# Patient Record
Sex: FEMALE | Race: WHITE | NOT HISPANIC OR LATINO
[De-identification: names, ages, dates, MRNs, and addresses within clinical notes are randomized per-mention and may not be internally consistent; named-entity substitution may affect disease eponyms.]

---

## 2017-04-19 ENCOUNTER — MEDICATION RENEWAL (OUTPATIENT)
Age: 22
End: 2017-04-19

## 2017-05-24 ENCOUNTER — MEDICATION RENEWAL (OUTPATIENT)
Age: 22
End: 2017-05-24

## 2018-04-09 ENCOUNTER — APPOINTMENT (OUTPATIENT)
Dept: OBGYN | Facility: CLINIC | Age: 23
End: 2018-04-09
Payer: COMMERCIAL

## 2018-04-09 VITALS
HEIGHT: 64 IN | BODY MASS INDEX: 23.22 KG/M2 | DIASTOLIC BLOOD PRESSURE: 73 MMHG | SYSTOLIC BLOOD PRESSURE: 117 MMHG | WEIGHT: 136 LBS | HEART RATE: 93 BPM

## 2018-04-09 PROCEDURE — 99213 OFFICE O/P EST LOW 20 MIN: CPT

## 2018-04-20 ENCOUNTER — APPOINTMENT (OUTPATIENT)
Dept: OBGYN | Facility: CLINIC | Age: 23
End: 2018-04-20
Payer: COMMERCIAL

## 2018-04-20 VITALS
DIASTOLIC BLOOD PRESSURE: 70 MMHG | WEIGHT: 136 LBS | SYSTOLIC BLOOD PRESSURE: 118 MMHG | HEIGHT: 64 IN | BODY MASS INDEX: 23.22 KG/M2

## 2018-04-20 DIAGNOSIS — B96.89 ACUTE VAGINITIS: ICD-10-CM

## 2018-04-20 DIAGNOSIS — N76.0 ACUTE VAGINITIS: ICD-10-CM

## 2018-04-20 PROCEDURE — 99213 OFFICE O/P EST LOW 20 MIN: CPT | Mod: 25

## 2018-04-20 PROCEDURE — 99395 PREV VISIT EST AGE 18-39: CPT

## 2018-04-20 RX ORDER — METRONIDAZOLE 7.5 MG/G
0.75 GEL VAGINAL
Qty: 1 | Refills: 2 | Status: ACTIVE | COMMUNITY
Start: 2018-04-20 | End: 1900-01-01

## 2018-04-23 LAB
C TRACH RRNA SPEC QL NAA+PROBE: NOT DETECTED
N GONORRHOEA RRNA SPEC QL NAA+PROBE: NOT DETECTED
SOURCE AMPLIFICATION: NORMAL
SOURCE TP AMPLIFICATION: NORMAL
T VAGINALIS RRNA SPEC QL NAA+PROBE: NOT DETECTED

## 2018-04-27 LAB — CYTOLOGY CVX/VAG DOC THIN PREP: NORMAL

## 2018-06-12 ENCOUNTER — APPOINTMENT (OUTPATIENT)
Dept: OBGYN | Facility: CLINIC | Age: 23
End: 2018-06-12
Payer: COMMERCIAL

## 2018-06-12 VITALS
HEIGHT: 64 IN | BODY MASS INDEX: 23.22 KG/M2 | SYSTOLIC BLOOD PRESSURE: 110 MMHG | DIASTOLIC BLOOD PRESSURE: 70 MMHG | WEIGHT: 136 LBS

## 2018-06-12 DIAGNOSIS — N80.1 ENDOMETRIOSIS OF OVARY: ICD-10-CM

## 2018-06-12 PROCEDURE — 99214 OFFICE O/P EST MOD 30 MIN: CPT

## 2018-06-12 PROCEDURE — 76830 TRANSVAGINAL US NON-OB: CPT

## 2018-06-12 PROCEDURE — 76857 US EXAM PELVIC LIMITED: CPT

## 2018-09-13 ENCOUNTER — APPOINTMENT (OUTPATIENT)
Dept: OBGYN | Facility: CLINIC | Age: 23
End: 2018-09-13
Payer: COMMERCIAL

## 2018-09-13 ENCOUNTER — ASOB RESULT (OUTPATIENT)
Age: 23
End: 2018-09-13

## 2018-09-13 VITALS
HEIGHT: 64 IN | SYSTOLIC BLOOD PRESSURE: 110 MMHG | WEIGHT: 142 LBS | DIASTOLIC BLOOD PRESSURE: 68 MMHG | BODY MASS INDEX: 24.24 KG/M2

## 2018-09-13 DIAGNOSIS — Z86.19 PERSONAL HISTORY OF OTHER INFECTIOUS AND PARASITIC DISEASES: ICD-10-CM

## 2018-09-13 PROCEDURE — 76830 TRANSVAGINAL US NON-OB: CPT

## 2018-09-13 PROCEDURE — 76857 US EXAM PELVIC LIMITED: CPT

## 2018-09-13 PROCEDURE — 99214 OFFICE O/P EST MOD 30 MIN: CPT

## 2018-09-17 LAB — BACTERIA GENITAL AEROBE CULT: ABNORMAL

## 2019-05-20 ENCOUNTER — MEDICATION RENEWAL (OUTPATIENT)
Age: 24
End: 2019-05-20

## 2019-05-20 RX ORDER — LEVONORGESTREL AND ETHINYL ESTRADIOL 0.1-0.02MG
0.1-2 KIT ORAL
Qty: 90 | Refills: 3 | Status: ACTIVE | COMMUNITY
Start: 2018-09-13 | End: 1900-01-01

## 2019-05-24 ENCOUNTER — APPOINTMENT (OUTPATIENT)
Dept: OBGYN | Facility: CLINIC | Age: 24
End: 2019-05-24
Payer: COMMERCIAL

## 2019-05-24 VITALS
WEIGHT: 147 LBS | DIASTOLIC BLOOD PRESSURE: 81 MMHG | HEIGHT: 64 IN | SYSTOLIC BLOOD PRESSURE: 138 MMHG | BODY MASS INDEX: 25.1 KG/M2

## 2019-05-24 PROCEDURE — 99395 PREV VISIT EST AGE 18-39: CPT

## 2019-05-24 NOTE — COUNSELING
[Nutrition] : nutrition [Breast Self Exam] : breast self exam [Contraception] : contraception [Vitamins/Supplements] : vitamins/supplements [Exercise] : exercise

## 2019-05-24 NOTE — PHYSICAL EXAM
[Alert] : alert [Awake] : awake [Soft] : soft [Oriented x3] : oriented to person, place, and time [Normal] : cervix [No Bleeding] : there was no active vaginal bleeding [Uterine Adnexae] : were not tender and not enlarged [Acute Distress] : no acute distress [LAD] : no lymphadenopathy [Thyroid Nodule] : no thyroid nodule [Goiter] : no goiter [Mass] : no breast mass [Nipple Discharge] : no nipple discharge [Axillary LAD] : no axillary lymphadenopathy [Tender] : non tender [Distended] : not distended [H/Smegaly] : no hepatosplenomegaly [Depressed Mood] : not depressed [Flat Affect] : affect not flat

## 2019-05-31 ENCOUNTER — TRANSCRIPTION ENCOUNTER (OUTPATIENT)
Age: 24
End: 2019-05-31

## 2019-05-31 LAB
C TRACH RRNA SPEC QL NAA+PROBE: NORMAL
CYTOLOGY CVX/VAG DOC THIN PREP: NORMAL
N GONORRHOEA RRNA SPEC QL NAA+PROBE: NOT DETECTED
SOURCE TP AMPLIFICATION: NORMAL

## 2019-06-06 ENCOUNTER — APPOINTMENT (OUTPATIENT)
Dept: OBGYN | Facility: CLINIC | Age: 24
End: 2019-06-06

## 2020-02-12 ENCOUNTER — APPOINTMENT (OUTPATIENT)
Dept: OBGYN | Facility: CLINIC | Age: 25
End: 2020-02-12
Payer: COMMERCIAL

## 2020-02-12 VITALS
SYSTOLIC BLOOD PRESSURE: 125 MMHG | DIASTOLIC BLOOD PRESSURE: 80 MMHG | WEIGHT: 147 LBS | BODY MASS INDEX: 25.1 KG/M2 | HEIGHT: 64 IN

## 2020-02-12 PROCEDURE — 99214 OFFICE O/P EST MOD 30 MIN: CPT

## 2020-02-12 NOTE — PHYSICAL EXAM
[Normal] : uterus [No Bleeding] : there was no active vaginal bleeding [Uterine Adnexae] : was normal on the left [FreeTextEntry7] : right adnexal fullness,slightly tender to palp

## 2020-02-13 LAB
C TRACH RRNA SPEC QL NAA+PROBE: NOT DETECTED
N GONORRHOEA RRNA SPEC QL NAA+PROBE: NOT DETECTED
SOURCE AMPLIFICATION: NORMAL

## 2020-02-24 ENCOUNTER — APPOINTMENT (OUTPATIENT)
Dept: OBGYN | Facility: CLINIC | Age: 25
End: 2020-02-24
Payer: COMMERCIAL

## 2020-02-24 ENCOUNTER — ASOB RESULT (OUTPATIENT)
Age: 25
End: 2020-02-24

## 2020-02-24 VITALS
SYSTOLIC BLOOD PRESSURE: 115 MMHG | DIASTOLIC BLOOD PRESSURE: 79 MMHG | WEIGHT: 142 LBS | HEIGHT: 64 IN | BODY MASS INDEX: 24.24 KG/M2

## 2020-02-24 PROCEDURE — 99214 OFFICE O/P EST MOD 30 MIN: CPT

## 2020-02-24 PROCEDURE — 76830 TRANSVAGINAL US NON-OB: CPT

## 2020-02-24 PROCEDURE — 76856 US EXAM PELVIC COMPLETE: CPT | Mod: 59

## 2020-04-07 ENCOUNTER — RX RENEWAL (OUTPATIENT)
Age: 25
End: 2020-04-07

## 2020-04-07 RX ORDER — LEVONORGESTREL AND ETHINYL ESTRADIOL 0.1-0.02MG
0.1-2 KIT ORAL DAILY
Qty: 84 | Refills: 0 | Status: ACTIVE | COMMUNITY
Start: 2020-04-07 | End: 1900-01-01

## 2020-12-16 PROBLEM — N76.0 BACTERIAL VAGINOSIS: Status: RESOLVED | Noted: 2018-04-20 | Resolved: 2020-12-16

## 2020-12-16 PROBLEM — Z86.19 HISTORY OF CANDIDIASIS OF VAGINA: Status: RESOLVED | Noted: 2018-09-17 | Resolved: 2020-12-16

## 2021-06-17 ENCOUNTER — RESULT REVIEW (OUTPATIENT)
Age: 26
End: 2021-06-17

## 2021-07-29 ENCOUNTER — APPOINTMENT (OUTPATIENT)
Dept: OBGYN | Facility: CLINIC | Age: 26
End: 2021-07-29
Payer: COMMERCIAL

## 2021-07-29 VITALS
BODY MASS INDEX: 24.75 KG/M2 | WEIGHT: 145 LBS | DIASTOLIC BLOOD PRESSURE: 78 MMHG | HEIGHT: 64 IN | SYSTOLIC BLOOD PRESSURE: 108 MMHG

## 2021-07-29 DIAGNOSIS — Z30.9 ENCOUNTER FOR CONTRACEPTIVE MANAGEMENT, UNSPECIFIED: ICD-10-CM

## 2021-07-29 DIAGNOSIS — Z30.41 ENCOUNTER FOR SURVEILLANCE OF CONTRACEPTIVE PILLS: ICD-10-CM

## 2021-07-29 PROCEDURE — 99395 PREV VISIT EST AGE 18-39: CPT

## 2021-07-29 PROCEDURE — 99213 OFFICE O/P EST LOW 20 MIN: CPT | Mod: 25

## 2021-07-29 NOTE — HISTORY OF PRESENT ILLNESS
[FreeTextEntry1] : 25 yo hx of endometroma, pt admits to having  a heavy menses this month after having surgery for tonsils. SHe did also have lower pelvic pain.  uses condoms and is happy did not do well with OC.  couldn’t sleep and guzman. [Currently Active] : currently active [Men] : men [Vaginal] : vaginal [No] : No

## 2021-07-29 NOTE — DISCUSSION/SUMMARY
[FreeTextEntry1] : encouraged pt to exercise 2.5 hours minimally a week as per the [facility] , to monitor her cycles, to take calcium foods equalling 1000 mg daily and vitamin D 2000 IU daily and fish oil or flax seed oil for omega 3s. REturn in one yr for a visit. questions answered.\par Send for vag sono

## 2021-07-29 NOTE — PHYSICAL EXAM
[Appropriately responsive] : appropriately responsive [Alert] : alert [No Acute Distress] : no acute distress [No Lymphadenopathy] : no lymphadenopathy [Soft] : soft [Non-tender] : non-tender [Non-distended] : non-distended [No HSM] : No HSM [No Lesions] : no lesions [No Mass] : no mass [Oriented x3] : oriented x3 [Examination Of The Breasts] : a normal appearance [No Masses] : no breast masses were palpable [Labia Majora] : normal [Labia Minora] : normal [Normal] : normal [Uterine Adnexae] : normal [FreeTextEntry6] : some discomfort in the llq

## 2021-08-05 LAB — CYTOLOGY CVX/VAG DOC THIN PREP: ABNORMAL

## 2021-08-05 RX ORDER — FLUCONAZOLE 150 MG/1
150 TABLET ORAL
Qty: 2 | Refills: 0 | Status: ACTIVE | COMMUNITY
Start: 2018-09-17 | End: 1900-01-01

## 2021-08-17 ENCOUNTER — ASOB RESULT (OUTPATIENT)
Age: 26
End: 2021-08-17

## 2021-08-17 ENCOUNTER — APPOINTMENT (OUTPATIENT)
Dept: OBGYN | Facility: CLINIC | Age: 26
End: 2021-08-17
Payer: COMMERCIAL

## 2021-08-17 VITALS
BODY MASS INDEX: 24.92 KG/M2 | HEART RATE: 82 BPM | WEIGHT: 146 LBS | DIASTOLIC BLOOD PRESSURE: 73 MMHG | HEIGHT: 64 IN | SYSTOLIC BLOOD PRESSURE: 111 MMHG

## 2021-08-17 PROCEDURE — 99214 OFFICE O/P EST MOD 30 MIN: CPT

## 2021-08-17 PROCEDURE — 76856 US EXAM PELVIC COMPLETE: CPT | Mod: 59

## 2021-08-17 PROCEDURE — 76830 TRANSVAGINAL US NON-OB: CPT

## 2021-08-17 NOTE — HISTORY OF PRESENT ILLNESS
[FreeTextEntry1] : 27 yo had vaginal sono revieled a corpus leuteum cyst that was hemmorragic. She has no pain today. WE disc following up in 5 weeks.

## 2021-08-17 NOTE — DISCUSSION/SUMMARY
[FreeTextEntry1] :  Pt has no pain today, hemmoragic corpus leuteum cystfollow up after next cycle for resolution of cyst. \par motrin for pain and rtn if increased pain

## 2021-09-14 ENCOUNTER — APPOINTMENT (OUTPATIENT)
Dept: OBGYN | Facility: CLINIC | Age: 26
End: 2021-09-14
Payer: COMMERCIAL

## 2021-09-14 ENCOUNTER — ASOB RESULT (OUTPATIENT)
Age: 26
End: 2021-09-14

## 2021-09-14 VITALS — WEIGHT: 146 LBS | HEIGHT: 64 IN | BODY MASS INDEX: 24.92 KG/M2

## 2021-09-14 DIAGNOSIS — N83.12 CORPUS LUTEUM CYST OF LEFT OVARY: ICD-10-CM

## 2021-09-14 DIAGNOSIS — N83.201 UNSPECIFIED OVARIAN CYST, RIGHT SIDE: ICD-10-CM

## 2021-09-14 PROCEDURE — 99213 OFFICE O/P EST LOW 20 MIN: CPT

## 2021-09-14 PROCEDURE — 76830 TRANSVAGINAL US NON-OB: CPT

## 2021-09-14 PROCEDURE — 76857 US EXAM PELVIC LIMITED: CPT

## 2021-09-14 NOTE — DISCUSSION/SUMMARY
[FreeTextEntry1] : disc resolution of the hemorragic  corpus luteum, now has 3.5cm simple ovarian right cyst. can follow up in 6 weeks

## 2021-09-14 NOTE — HISTORY OF PRESENT ILLNESS
[FreeTextEntry1] : 27 yo here to review rpt sono for fu on a hemorrhagic corpus luteum cyst.  she denies any pain  or prob at this time

## 2022-05-08 ENCOUNTER — EMERGENCY (EMERGENCY)
Facility: HOSPITAL | Age: 27
LOS: 1 days | Discharge: HOME | End: 2022-05-08
Admitting: EMERGENCY MEDICINE
Payer: MEDICAID

## 2022-05-08 PROCEDURE — 99284 EMERGENCY DEPT VISIT MOD MDM: CPT

## 2022-05-10 DIAGNOSIS — R05.9 COUGH, UNSPECIFIED: ICD-10-CM

## 2022-05-10 DIAGNOSIS — J45.909 UNSPECIFIED ASTHMA, UNCOMPLICATED: ICD-10-CM

## 2022-05-10 DIAGNOSIS — U07.1 COVID-19: ICD-10-CM

## 2022-09-06 ENCOUNTER — APPOINTMENT (OUTPATIENT)
Dept: OBGYN | Facility: CLINIC | Age: 27
End: 2022-09-06

## 2022-09-06 ENCOUNTER — NON-APPOINTMENT (OUTPATIENT)
Age: 27
End: 2022-09-06

## 2022-09-06 VITALS
DIASTOLIC BLOOD PRESSURE: 70 MMHG | BODY MASS INDEX: 24.92 KG/M2 | SYSTOLIC BLOOD PRESSURE: 110 MMHG | WEIGHT: 146 LBS | HEIGHT: 64 IN

## 2022-09-06 DIAGNOSIS — Z01.419 ENCOUNTER FOR GYNECOLOGICAL EXAMINATION (GENERAL) (ROUTINE) W/OUT ABNORMAL FINDINGS: ICD-10-CM

## 2022-09-06 PROCEDURE — 99395 PREV VISIT EST AGE 18-39: CPT

## 2022-09-06 NOTE — PLAN
[FreeTextEntry1] : Well woman visit\par \par pap done\par std cultures done\par rt in 1 year\par \par Left ovarian cyst- rt for pelvic sono to monitor cyst and then can see Md to discuss endometriosis, i explained how Ocp help reduce formation of ovarian cyst and also are first line medication for endometriosis.

## 2022-09-07 LAB
C TRACH RRNA SPEC QL NAA+PROBE: NOT DETECTED
N GONORRHOEA RRNA SPEC QL NAA+PROBE: NOT DETECTED
SOURCE TP AMPLIFICATION: NORMAL

## 2022-09-12 LAB — CYTOLOGY CVX/VAG DOC THIN PREP: NORMAL

## 2022-10-05 ENCOUNTER — APPOINTMENT (OUTPATIENT)
Dept: ANTEPARTUM | Facility: CLINIC | Age: 27
End: 2022-10-05

## 2022-10-05 ENCOUNTER — ASOB RESULT (OUTPATIENT)
Age: 27
End: 2022-10-05

## 2022-10-05 ENCOUNTER — APPOINTMENT (OUTPATIENT)
Dept: OBGYN | Facility: CLINIC | Age: 27
End: 2022-10-05

## 2022-10-05 VITALS
BODY MASS INDEX: 25.1 KG/M2 | HEIGHT: 64 IN | SYSTOLIC BLOOD PRESSURE: 110 MMHG | WEIGHT: 147 LBS | DIASTOLIC BLOOD PRESSURE: 73 MMHG

## 2022-10-05 DIAGNOSIS — N83.202 UNSPECIFIED OVARIAN CYST, LEFT SIDE: ICD-10-CM

## 2022-10-05 DIAGNOSIS — R10.2 PELVIC AND PERINEAL PAIN: ICD-10-CM

## 2022-10-05 DIAGNOSIS — N94.6 DYSMENORRHEA, UNSPECIFIED: ICD-10-CM

## 2022-10-05 DIAGNOSIS — N94.10 UNSPECIFIED DYSPAREUNIA: ICD-10-CM

## 2022-10-05 PROCEDURE — 76857 US EXAM PELVIC LIMITED: CPT | Mod: 59

## 2022-10-05 PROCEDURE — 76830 TRANSVAGINAL US NON-OB: CPT

## 2022-10-05 PROCEDURE — 99215 OFFICE O/P EST HI 40 MIN: CPT

## 2022-10-05 NOTE — HISTORY OF PRESENT ILLNESS
[FreeTextEntry1] : 27-year-old white female  0 here for follow-up visit for multiple complaints.  Firstly patient reports severe pain associated with intercourse.  Patient describes pain at first insertion.  Patient reports that she is always had this.  Secondly patient reports very painful menses as well as pain is associated around her menses.  She is concerned about endometriosis.  She presents today for ultrasound and discussion.\par \par Ultrasound reveals a 7.2 x 3.5 cm uterus with normal endometrium.  She appears to have a 5 cm simple left-sided ovarian cyst her right adnexa is normal.\par \par Patient declines exam as she is menstruating

## 2022-10-05 NOTE — DISCUSSION/SUMMARY
[FreeTextEntry1] : I discussed the clinical picture at length including all her symptoms.\par \par Regarding ovarian cyst we discussed high high incidence of ovarian cysts, high likelihood of being benign, also high likelihood of spontaneous resolution.  I also discussed lack of available medical modality to treat the cyst.  I discussed considering hormonal contraception to prevent them.  I discussed risks including rupture, torsion etc. with the patient.  She appears quite asymptomatic with this at the moment.  We discussed repeating sonogram in 8 weeks to ensure resolution.\par \par Regarding her dyspareunia or pelvic pain we also had a long discussion.  Patient is concerned about endometriosis and using pictorial aids I discussed the etiology and management of endometriosis.  I discussed that endometriosis occurs when the endometrium grows in other areas and therefore the hallmark of treatment is to prevent bleeding.  This is accomplished by hormonal methods and I discussed first-line being oral contraceptives versus considering Depo-Provera or even GnRH agonists.  Patient has in the past tried oral contraceptives and not been satisfied.  I discussed the progressive nature of endometriosis and its eventual resolution at the time of menopause.  For now patient wishes to explore her options and she will advise me.\par \par Regarding her dyspareunia we discussed that her symptoms are really more consistent with vaginismus and it is right at the time of insertion.  I discussed using vaginal dilators to see if she can alleviate some of her anxiety and discomfort.  All her questions were answered.

## 2023-01-17 ENCOUNTER — APPOINTMENT (OUTPATIENT)
Dept: OBGYN | Facility: CLINIC | Age: 28
End: 2023-01-17

## 2023-01-17 ENCOUNTER — APPOINTMENT (OUTPATIENT)
Dept: ANTEPARTUM | Facility: CLINIC | Age: 28
End: 2023-01-17

## 2023-06-12 ENCOUNTER — APPOINTMENT (OUTPATIENT)
Dept: OBGYN | Facility: CLINIC | Age: 28
End: 2023-06-12
Payer: COMMERCIAL

## 2023-06-12 VITALS
HEIGHT: 64 IN | DIASTOLIC BLOOD PRESSURE: 67 MMHG | HEART RATE: 72 BPM | SYSTOLIC BLOOD PRESSURE: 122 MMHG | BODY MASS INDEX: 25.61 KG/M2 | WEIGHT: 150 LBS

## 2023-06-12 DIAGNOSIS — N83.299 OTHER OVARIAN CYST, UNSPECIFIED SIDE: ICD-10-CM

## 2023-06-12 DIAGNOSIS — U07.1 COVID-19: ICD-10-CM

## 2023-06-12 PROCEDURE — 99212 OFFICE O/P EST SF 10 MIN: CPT

## 2023-06-12 NOTE — HISTORY OF PRESENT ILLNESS
[FreeTextEntry1] : She is a 28-year-old  0 last menstrual period Kaylee 3, 2023\par Patient presents for initial visit and evaluation with history of pelvic pain and a large ovarian cystic mass

## 2023-06-12 NOTE — PLAN
[FreeTextEntry1] : Patient is a 28-year-old  0 last menstrual period Kaylee 3, 2023\par Patient presents for evaluation complaining of pelvic pain and findings on  CAT scan that showed a large pelvic cystic mass\par CAT scan from 2023 shows a 14.8 x 10.6 x 10.5 cm thin-walled midline pelvic cyst\par No evidence of pelvic adenopathy\par Bowel loops appear to be normal\par Pelvic sonogram from May 30 July 3 showed a large adnexal cyst with debris\par Physical exam reveals a well-developed well-nourished female no apparent distress, calm, BMI 27\par Pelvic exam shows normal female external genitalia, vagina lesions, cervix appropriate size, nontender, uterus adnexa unable to be assessed secondary to the large pelvic cystic mass\par Pap smear was performed\par Vaginal culture performed\par Results discussed with patient\par Patient states that she has difficulty and pain with relations and has been advised to possibly consider that she has diagnosis of endometriosis\par Due to the size of the cystic mass,,, discussed at length surgical approach options,, straight stick laparoscopy versus robotic laparoscopy approach versus laparotomy approach\par Concern patient is a nulliparous female,, obviously we will try to maintain as much ovarian tissue as possible.\par Possibility of having to lose that ovary completely discussed at length with patient\par Discussed that even with 1 ovary there will be no change in her cycle pattern, conceiving ability, carrying a pregnancy ability, or delivering, and of course eventual menopause at the appropriate time\par Questions answered\par Patient states he understands\par Patient will be considered for robotic approach but understands the consent will also read possible laparotomy the time of surgery\par \par \par Past medical history,,, asthma, history of COVID\par Past surgical history,,, left knee, ACL, right breast lump excision benign, tonsillectomy\par Allergies,,, Levaquin\par Medications,,,, Zyrtec, asthma inhaler,, Cymbalta\par Past OB history,,, patient denies\par Past GYN history,,, medications 8, interval 28, duration 7 days, patient states she had been on birth control approximately 6 years in the past, denies any history of PID or STDs\par Tobacco use,,, patient denies\par Alcohol use,,, social use,\par Drug use,,, patient denies\par Family history,,, mother alive history of breast cancer, hypertension, and adrenal mass benign,, father,, alive and well,, denies any family history of breast or ovarian cancer\par

## 2023-06-12 NOTE — PHYSICAL EXAM
[Chaperone Present] : A chaperone was present in the examining room during all aspects of the physical examination [Appropriately responsive] : appropriately responsive [Alert] : alert [No Acute Distress] : no acute distress [No Lymphadenopathy] : no lymphadenopathy [Regular Rate Rhythm] : regular rate rhythm [No Murmurs] : no murmurs [Clear to Auscultation B/L] : clear to auscultation bilaterally [Soft] : soft [No HSM] : No HSM [No Lesions] : no lesions [No Mass] : no mass [Oriented x3] : oriented x3 [Examination Of The Breasts] : a normal appearance [No Masses] : no breast masses were palpable [Labia Majora] : normal [Labia Minora] : normal [Normal] : normal [FreeTextEntry7] : Midline large cystic mass approximately 18 weeks size,, mildly tender to palpation [Tenderness] : nontender [FreeTextEntry6] : Unable to assess accurately secondary to the large cystic mass at midline

## 2023-06-26 LAB
A VAGINAE DNA VAG QL NAA+PROBE: NORMAL
BVAB2 DNA VAG QL NAA+PROBE: NORMAL
C KRUSEI DNA VAG QL NAA+PROBE: NEGATIVE
C KRUSEI DNA VAG QL NAA+PROBE: POSITIVE
C TRACH RRNA SPEC QL NAA+PROBE: NEGATIVE
CANDIDA DNA VAG QL NAA+PROBE: NEGATIVE
CYTOLOGY CVX/VAG DOC THIN PREP: ABNORMAL
MEGA1 DNA VAG QL NAA+PROBE: NORMAL
N GONORRHOEA RRNA SPEC QL NAA+PROBE: NEGATIVE
T VAGINALIS RRNA SPEC QL NAA+PROBE: NEGATIVE

## 2023-11-06 ENCOUNTER — APPOINTMENT (OUTPATIENT)
Dept: OBGYN | Facility: CLINIC | Age: 28
End: 2023-11-06
Payer: COMMERCIAL

## 2023-11-06 VITALS
HEART RATE: 79 BPM | DIASTOLIC BLOOD PRESSURE: 83 MMHG | HEIGHT: 64 IN | SYSTOLIC BLOOD PRESSURE: 118 MMHG | BODY MASS INDEX: 24.41 KG/M2 | WEIGHT: 143 LBS

## 2023-11-06 DIAGNOSIS — Z98.890 OTHER SPECIFIED POSTPROCEDURAL STATES: ICD-10-CM

## 2023-11-06 DIAGNOSIS — Z12.4 ENCOUNTER FOR SCREENING FOR MALIGNANT NEOPLASM OF CERVIX: ICD-10-CM

## 2023-11-06 DIAGNOSIS — N92.0 EXCESSIVE AND FREQUENT MENSTRUATION WITH REGULAR CYCLE: ICD-10-CM

## 2023-11-06 DIAGNOSIS — Z11.51 ENCOUNTER FOR SCREENING FOR HUMAN PAPILLOMAVIRUS (HPV): ICD-10-CM

## 2023-11-06 DIAGNOSIS — Z87.42 OTHER SPECIFIED POSTPROCEDURAL STATES: ICD-10-CM

## 2023-11-06 PROCEDURE — 99213 OFFICE O/P EST LOW 20 MIN: CPT

## 2023-11-06 RX ORDER — NORETHINDRONE ACETATE AND ETHINYL ESTRADIOL, ETHINYL ESTRADIOL AND FERROUS FUMARATE 1MG-10(24)
1 MG-10 MCG / KIT ORAL
Qty: 84 | Refills: 3 | Status: ACTIVE | COMMUNITY
Start: 2023-11-06 | End: 1900-01-01

## 2023-11-27 ENCOUNTER — NON-APPOINTMENT (OUTPATIENT)
Age: 28
End: 2023-11-27

## 2024-04-29 ENCOUNTER — NON-APPOINTMENT (OUTPATIENT)
Age: 29
End: 2024-04-29

## 2024-07-08 ENCOUNTER — APPOINTMENT (OUTPATIENT)
Dept: OBGYN | Facility: CLINIC | Age: 29
End: 2024-07-08
Payer: COMMERCIAL

## 2024-07-08 ENCOUNTER — ASOB RESULT (OUTPATIENT)
Age: 29
End: 2024-07-08

## 2024-07-08 VITALS
SYSTOLIC BLOOD PRESSURE: 113 MMHG | BODY MASS INDEX: 27.49 KG/M2 | HEART RATE: 79 BPM | HEIGHT: 64 IN | DIASTOLIC BLOOD PRESSURE: 68 MMHG | WEIGHT: 161 LBS

## 2024-07-08 DIAGNOSIS — E28.2 POLYCYSTIC OVARIAN SYNDROME: ICD-10-CM

## 2024-07-08 DIAGNOSIS — R10.2 PELVIC AND PERINEAL PAIN: ICD-10-CM

## 2024-07-08 DIAGNOSIS — Z01.419 ENCOUNTER FOR GYNECOLOGICAL EXAMINATION (GENERAL) (ROUTINE) W/OUT ABNORMAL FINDINGS: ICD-10-CM

## 2024-07-08 PROCEDURE — 76856 US EXAM PELVIC COMPLETE: CPT | Mod: 59

## 2024-07-08 PROCEDURE — 99213 OFFICE O/P EST LOW 20 MIN: CPT | Mod: 25

## 2024-07-08 PROCEDURE — 76830 TRANSVAGINAL US NON-OB: CPT

## 2024-07-08 PROCEDURE — 93975 VASCULAR STUDY: CPT

## 2024-07-08 PROCEDURE — 99459 PELVIC EXAMINATION: CPT

## 2024-07-10 PROBLEM — E28.2 PCOS (POLYCYSTIC OVARIAN SYNDROME): Status: ACTIVE | Noted: 2024-07-10

## 2024-07-12 DIAGNOSIS — B37.31 ACUTE CANDIDIASIS OF VULVA AND VAGINA: ICD-10-CM

## 2024-07-12 LAB
A VAGINAE DNA VAG QL NAA+PROBE: NORMAL
BVAB2 DNA VAG QL NAA+PROBE: NORMAL
C KRUSEI DNA VAG QL NAA+PROBE: NEGATIVE
C KRUSEI DNA VAG QL NAA+PROBE: NEGATIVE
C KRUSEI DNA VAG QL NAA+PROBE: POSITIVE
C TRACH RRNA SPEC QL NAA+PROBE: NEGATIVE
CANDIDA DNA VAG QL NAA+PROBE: NEGATIVE
MEGA1 DNA VAG QL NAA+PROBE: NORMAL
N GONORRHOEA RRNA SPEC QL NAA+PROBE: NEGATIVE
T VAGINALIS RRNA SPEC QL NAA+PROBE: NEGATIVE

## 2024-07-12 RX ORDER — METFORMIN HYDROCHLORIDE 500 MG/1
500 TABLET, COATED ORAL
Qty: 180 | Refills: 3 | Status: ACTIVE | COMMUNITY
Start: 2024-07-10 | End: 1900-01-01

## 2024-07-12 RX ORDER — NORETHINDRONE ACETATE AND ETHINYL ESTRADIOL, ETHINYL ESTRADIOL AND FERROUS FUMARATE 1MG-10(24)
1 MG-10 MCG / KIT ORAL
Qty: 84 | Refills: 3 | Status: ACTIVE | COMMUNITY
Start: 2024-07-10 | End: 1900-01-01

## 2024-07-12 RX ORDER — FLUCONAZOLE 150 MG/1
150 TABLET ORAL
Qty: 1 | Refills: 2 | Status: ACTIVE | COMMUNITY
Start: 2024-07-12 | End: 1900-01-01